# Patient Record
Sex: FEMALE | Race: WHITE | NOT HISPANIC OR LATINO | Employment: OTHER | URBAN - METROPOLITAN AREA
[De-identification: names, ages, dates, MRNs, and addresses within clinical notes are randomized per-mention and may not be internally consistent; named-entity substitution may affect disease eponyms.]

---

## 2023-12-06 ENCOUNTER — APPOINTMENT (OUTPATIENT)
Dept: DERMATOLOGY | Facility: CLINIC | Age: 33
End: 2023-12-06
Payer: COMMERCIAL

## 2024-03-05 ENCOUNTER — OFFICE VISIT (OUTPATIENT)
Dept: DERMATOLOGY | Facility: CLINIC | Age: 34
End: 2024-03-05
Payer: COMMERCIAL

## 2024-03-05 DIAGNOSIS — D22.9 BENIGN PIGMENTED MOLE: ICD-10-CM

## 2024-03-05 DIAGNOSIS — C84.00 MYCOSIS FUNGOIDES, UNSPECIFIED BODY REGION (MULTI): Primary | ICD-10-CM

## 2024-03-05 DIAGNOSIS — Z31.69 GENERAL COUNSELING AND ADVICE ON PROCREATIVE MANAGEMENT: ICD-10-CM

## 2024-03-05 PROCEDURE — 99214 OFFICE O/P EST MOD 30 MIN: CPT | Performed by: DERMATOLOGY

## 2024-03-05 RX ORDER — TRIAMCINOLONE ACETONIDE 1 MG/G
OINTMENT TOPICAL 2 TIMES DAILY
Qty: 80 G | Refills: 11 | Status: SHIPPED | OUTPATIENT
Start: 2024-03-05

## 2024-03-05 NOTE — PROGRESS NOTES
Subjective     Femi Garcias is a 33 y.o. female who presents for the following: CTCL (Cutaneous T-cell Lymphoma.     Here today for follow up of folliculotropic MF with follicular mucinosis.  At her last visit in 11/2022, she continued to taper her phototherapy, and currently has been off treatment for .      Review of Systems:  No other skin or systemic complaints other than what is documented elsewhere in the note.    The following portions of the chart were reviewed this encounter and updated as appropriate:  Allergies  Meds         Skin Cancer History  Folliculotropic mycosis fungoides with follicular mucinosis  - Conferenced 12-  - Recommended skin directed therapy with [facility]  - Treatment History    - [facility]:  12/2021 - 1/2023      Specialty Problems    None       Objective   Well appearing patient in no apparent distress; mood and affect are within normal limits.    A full examination was performed including scalp, head, eyes, ears, nose, lips, neck, chest, axillae, abdomen, back, buttocks, bilateral upper extremities, bilateral lower extremities, hands, feet, fingers, toes, fingernails, and toenails. All findings within normal limits unless otherwise noted below.    No cervical, axillary, or inguinal lymphadenopathy bilaterally.    Assessment/Plan   1. Mycosis fungoides, unspecified body region (CMS/HCC)  Right Forearm - Anterior  A few slightly scaly erythematous patches on the underarms bilaterally as well as the forearm.    Folliculotropic MF  - concern for recurrence vs dermatitis, but mild and not bothersome  - patient would like to avoid biopsies today if possible  - wanting to get pregnant sometime within the next year, so avoid bexarotene or valchlor  - will treat with triamcinolone (1 month on, 1 month off)  - follow up in 1 year    Related Procedures  Follow Up In Dermatology - Established Patient    Related Medications  triamcinolone (Kenalog) 0.1 % ointment  Apply topically 2 times a  day.    2. Benign pigmented mole  Left Lower Leg - Posterior  Small, dark macule on the left posterior calf with slight scalloping on dermoscopy.              Pigmented mole  - asymptomatic, small, likely benign  - will observe  - picture taken today    3. General counseling and advice on procreative management      Edwar Yan MD    I saw and evaluated the patient. I personally obtained the key and critical portions of the history and physical exam or was physically present for key and critical portions performed by the resident/fellow. I reviewed the resident/fellow's documentation and discussed the patient with the resident/fellow. I agree with the resident/fellow's medical decision making as documented in the note and made changes where appropriate.

## 2025-02-04 ENCOUNTER — APPOINTMENT (OUTPATIENT)
Dept: DERMATOLOGY | Facility: CLINIC | Age: 35
End: 2025-02-04
Payer: COMMERCIAL

## 2025-02-04 DIAGNOSIS — C84.00 MYCOSIS FUNGOIDES, UNSPECIFIED BODY REGION (MULTI): ICD-10-CM

## 2025-02-04 PROCEDURE — G2211 COMPLEX E/M VISIT ADD ON: HCPCS | Performed by: DERMATOLOGY

## 2025-02-04 PROCEDURE — 99214 OFFICE O/P EST MOD 30 MIN: CPT | Performed by: DERMATOLOGY

## 2025-02-04 ASSESSMENT — DERMATOLOGY QUALITY OF LIFE (QOL) ASSESSMENT
DATE THE QUALITY-OF-LIFE ASSESSMENT WAS COMPLETED: 67240
WHAT SINGLE SKIN CONDITION LISTED BELOW IS THE PATIENT ANSWERING THE QUALITY-OF-LIFE ASSESSMENT QUESTIONS ABOUT: T-CELL LYMPHOMA
RATE HOW EMOTIONALLY BOTHERED YOU ARE BY YOUR SKIN PROBLEM (FOR EXAMPLE, WORRY, EMBARRASSMENT, FRUSTRATION): 0 - NEVER BOTHERED
RATE HOW BOTHERED YOU ARE BY EFFECTS OF YOUR SKIN PROBLEMS ON YOUR ACTIVITIES (EG, GOING OUT, ACCOMPLISHING WHAT YOU WANT, WORK ACTIVITIES OR YOUR RELATIONSHIPS WITH OTHERS): 0 - NEVER BOTHERED
RATE HOW BOTHERED YOU ARE BY SYMPTOMS OF YOUR SKIN PROBLEM (EG, ITCHING, STINGING BURNING, HURTING OR SKIN IRRITATION): 0 - NEVER BOTHERED

## 2025-02-04 ASSESSMENT — DERMATOLOGY PATIENT ASSESSMENT
ARE YOU ON BIRTH CONTROL: NO
DO YOU USE A TANNING BED: NO
HAVE YOU HAD OR DO YOU HAVE A STAPH INFECTION: NO
HAVE YOU HAD OR DO YOU HAVE VASCULAR DISEASE: NO
DO YOU USE SUNSCREEN: OCCASIONALLY
ARE YOU TRYING TO GET PREGNANT: NO
ARE YOU AN ORGAN TRANSPLANT RECIPIENT: NO
DO YOU HAVE IRREGULAR MENSTRUAL CYCLES: NO
DO YOU HAVE ANY NEW OR CHANGING LESIONS: YES

## 2025-02-04 ASSESSMENT — ITCH NUMERIC RATING SCALE: HOW SEVERE IS YOUR ITCHING?: 0

## 2025-02-04 ASSESSMENT — PATIENT GLOBAL ASSESSMENT (PGA): PATIENT GLOBAL ASSESSMENT: PATIENT GLOBAL ASSESSMENT:  1 - CLEAR

## 2025-02-04 NOTE — PATIENT INSTRUCTIONS
Thank you for coming in today!    Today we discussed your mycosis fungoides.  Today you don't have any spots that are overly concerning for MF.  If you develop any lesions on your scalp, please let us know, and we may biopsy it or inject it with steroid.    Please follow up in 1 year.

## 2025-02-04 NOTE — PROGRESS NOTES
Subjective     Femi Garcias is a 34 y.o. female who presents for the following: CTCL (Cutaneous T-cell Lymphoma (New spots: scalp, left arm, left hand/TAC prn).     Here today for follow up of folliculotropic MF with follicular mucinosis. At her last visit in 3/2024 there was concern for a mild recurrence vs dermatitis, but patient declined biopsies at that time.  As she was wanting to get pregnant soon, we avoided bexarotene and Valchlor and opted to treat with triamcinolone (1 month on, 1 month off).  She also used phototherapy in the past for about a year but didn't notice much of a change with it.    Gets lesions on her body that come and go without her using anything and aren't very itchy.  Usually go away with 2 weeks.  A month ago, she developed a scalp lesion that was very itchy, but this has subsided since then.    Per patient, the scalp lesion that showed folliculotropic MF in 2020 felt and looked different that the body lesions she gets.  Biopsies from her body since then have shown spongiotic dermatitis and/or follicular mucinosis.    Review of Systems:  No other skin or systemic complaints other than what is documented elsewhere in the note.    The following portions of the chart were reviewed this encounter and updated as appropriate:          Skin Cancer History  No skin cancer on file.      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 Recommended [facility]  - PET/CT, TCR, CBC, CMP (10/2020) - negative    Treatment History  - phototherapy  - topical steroids             Objective   Well appearing patient in no apparent distress; mood and affect are within normal limits.    A focused skin examination was performed. All findings within normal limits unless otherwise noted below.    Assessment/Plan   1. Mycosis fungoides, unspecified body region (Multi)  Left Upper  Arm - Anterior  Eczematous, well demarcated patches on the left upper arm, left breast, left wrist, upper mid back, and right flank.  Scab in location of recent lesion on the scalp.    Folliculotropic MF  - no lesions overtly concerning for MF today, body lesions are more consistent with dermatitis  - if patient develops any scalp lesions or other lesions concerning to her, she will let us know and we may consider biopsy and/or ILK  - wanting to get pregnant sometime within the next year, so avoid bexarotene or valchlor  - follow up in 1 year    Related Procedures  Follow Up In Dermatology - Established Patient  Follow Up In Dermatology - Established Patient    Related Medications  triamcinolone (Kenalog) 0.1 % ointment  Apply topically 2 times a day.    Follow up in 1 year    Edwar Yan MD  PGY-2, Dermatology    I saw and evaluated the patient. I personally obtained the key and critical portions of the history and physical exam or was physically present for key and critical portions performed by the resident/fellow. I reviewed the resident/fellow's documentation and discussed the patient with the resident/fellow. I agree with the resident/fellow's medical decision making as documented in the note and made changes where appropriate.

## 2025-03-05 ENCOUNTER — APPOINTMENT (OUTPATIENT)
Dept: DERMATOLOGY | Facility: CLINIC | Age: 35
End: 2025-03-05
Payer: COMMERCIAL

## 2026-02-03 ENCOUNTER — APPOINTMENT (OUTPATIENT)
Dept: DERMATOLOGY | Facility: CLINIC | Age: 36
End: 2026-02-03
Payer: COMMERCIAL